# Patient Record
Sex: FEMALE | Race: WHITE | Employment: OTHER
[De-identification: names, ages, dates, MRNs, and addresses within clinical notes are randomized per-mention and may not be internally consistent; named-entity substitution may affect disease eponyms.]

---

## 2023-04-24 ENCOUNTER — HOSPITAL ENCOUNTER (OUTPATIENT)
Facility: HOSPITAL | Age: 83
Setting detail: RECURRING SERIES
Discharge: HOME | End: 2023-04-27
Payer: MEDICARE

## 2023-04-24 PROCEDURE — 97162 PT EVAL MOD COMPLEX 30 MIN: CPT

## 2023-04-24 PROCEDURE — 97110 THERAPEUTIC EXERCISES: CPT

## 2023-04-24 PROCEDURE — 97535 SELF CARE MNGMENT TRAINING: CPT

## 2023-04-24 NOTE — PROGRESS NOTES
PHYSICAL / OCCUPATIONAL THERAPY - DAILY TREATMENT NOTE (updated )  For Eval visit    Patient Name: Margareth Larios    Date: 2023    : 1940  Insurance: Payor: MEDICARE / Plan: MEDICARE PART A AND B / Product Type: *No Product type* /      Patient  verified yes     Visit #   Current / Total 1 16   Time   In / Out 10:22 10:59   Pain   In / Out 0 2   Subjective Functional Status/Changes: See POC   Changes to:  Meds, Allergies, Med Hx, Sx Hx? If yes, update Summary List yes       TREATMENT AREA =  L Shoulder pain    OBJECTIVE       14 min   Eval - untimed                      Therapeutic Procedures: Tx Min Billable or 1:1 Min (if diff from Tx Min) Procedure, Rationale, Specifics   10  29056 Therapeutic Exercise (timed):  increase ROM, strength, coordination, balance, and proprioception to improve patient's ability to progress to PLOF and address remaining functional goals. (see flow sheet as applicable)     Details if applicable:     13  01789 Self Care/Home Management (timed):  improve patient knowledge and understanding of pain reducing techniques, positioning, posture/ergonomics, home safety, activity modification, diagnosis/prognosis, and physical therapy expectations, procedures and progression  to improve patient's ability to progress to PLOF and address remaining functional goals.   (see flow sheet as applicable)     Details if applicable:            Details if applicable:            Details if applicable:     21  [facility] Totals Reminder: bill using total billable min of TIMED therapeutic procedures (example: do not include dry needle or estim unattended, both untimed codes, in totals to left)  8-22 min = 1 unit; 23-37 min = 2 units; 38-52 min = 3 units; 53-67 min = 4 units; 68-82 min = 5 units   Total Total     [x]  Patient Education billed concurrently with other procedures   [x] Review HEP    [] Progressed/Changed HEP, detail:    [] Other detail:       Objective Information/Functional

## 2023-04-24 NOTE — THERAPY EVALUATION
[facility] PHYSICAL THERAPY  [address] [place] [address], [address] [place] [address], [address] [place] [address] [place]:[phone] Fx: [phone]  Plan of Care / Statement of Necessity for Physical Therapy Services     Patient Name: Tee Mcghee : 1940   Medical   Diagnosis: L knee pain Treatment Diagnosis: M25.562  LEFT KNEE PAIN        Onset Date: chronic     Referral Source: Marilyn Reeder MD Start of Care [facility]): 2023   Prior Hospitalization: See medical history Provider #: [id number]   Prior Level of Function: No pain with 10min walking, stair negotiation   Comorbidities: Hearing impairment, visual impairment, GI disease, Incontinence    Medications: Verified on Patient Summary List     Assessment / key information: Tee Mcghee is a 81 y.o. female with Dx: L knee pain starting many years ago. Notes the knee will buckle a few times a month but not due to pain; has more pain with prolonged standing and walking. Pt says she hasn't fallen due to the buckling, but will throw her off balance. Notices some weakness in her leg when she has been standing of 30min. Reports if she walks to the end of her community (10min) she will have a lot of pain in the knee, but the compression brace really helps. Also occasionally has pain at night after a busy day. Reports she has some tingling and numbness along the lateral side of her leg when standing for over 5min. X-rays of the knee last month show the replacement is in great shape for 15 years later. Notes no problems on the R knee but does have occasional numbness. Pt thinks her balance is pretty good but does recall one fall she thinks within the past year due to her trying to move too fast. Pt rates pain as 10/10 max, 0/10 at best, 0/10 currently. Objective: FOTO score = 58 (an established functional score where 100 = no disability). Gait: forwards trunk with decreased hip extension bilat. Palpation TTP along lateral knee and IT band.    Knee ROM Flexion R

## 2023-04-27 ENCOUNTER — APPOINTMENT (OUTPATIENT)
Facility: HOSPITAL | Age: 83
End: 2023-04-27
Payer: MEDICARE

## 2023-05-03 ENCOUNTER — APPOINTMENT (OUTPATIENT)
Facility: HOSPITAL | Age: 83
End: 2023-05-03
Payer: MEDICARE

## 2023-05-05 ENCOUNTER — APPOINTMENT (OUTPATIENT)
Facility: HOSPITAL | Age: 83
End: 2023-05-05
Payer: MEDICARE

## 2023-05-10 ENCOUNTER — HOSPITAL ENCOUNTER (OUTPATIENT)
Facility: HOSPITAL | Age: 83
Setting detail: RECURRING SERIES
Discharge: HOME | End: 2023-05-13
Payer: MEDICARE

## 2023-05-10 PROCEDURE — 97530 THERAPEUTIC ACTIVITIES: CPT

## 2023-05-10 PROCEDURE — 97110 THERAPEUTIC EXERCISES: CPT

## 2023-05-10 NOTE — PROGRESS NOTES
PHYSICAL / OCCUPATIONAL THERAPY - DAILY TREATMENT NOTE (updated )    Patient Name: Lili Bhakta    Date: 5/10/2023    : 1940  Insurance: Payor: MEDICARE / Plan: MEDICARE PART A AND B / Product Type: *No Product type* /      Patient  verified yes     Visit #   Current / Total 2 16   Time   In / Out 9:02 10:40   Pain   In / Out 0 2   Subjective Functional Status/Changes: Reports one instance of knee buckling since IE. Says she didn't fall, but the knee just gave out when she was standing. Changes to:  Meds, Allergies, Med Hx, Sx Hx? If yes, update Summary List yes       TREATMENT AREA =  L Knee pain    OBJECTIVE     Therapeutic Procedures: Tx Min Billable or 1:1 Min (if diff from Tx Min) Procedure, Rationale, Specifics   25  10141 Therapeutic Exercise (timed):  increase ROM, strength, coordination, balance, and proprioception to improve patient's ability to progress to PLOF and address remaining functional goals. (see flow sheet as applicable)     Details if applicable:       77234 Self Care/Home Management (timed):  improve patient knowledge and understanding of pain reducing techniques, positioning, posture/ergonomics, home safety, activity modification, diagnosis/prognosis, and physical therapy expectations, procedures and progression  to improve patient's ability to progress to PLOF and address remaining functional goals. (see flow sheet as applicable)     Details if applicable:     13   36269 Therapeutic Activity (timed):  use of dynamic activities replicating functional movements to increase ROM, strength, coordination, balance, and proprioception in order to improve patient's ability to progress to PLOF and address remaining functional goals.   (see flow sheet as applicable)     Details if applicable:            Details if applicable:     45  [facility] Totals Reminder: bill using total billable min of TIMED therapeutic procedures (example: do not include dry needle or estim unattended, both

## 2023-05-15 ENCOUNTER — HOSPITAL ENCOUNTER (OUTPATIENT)
Facility: HOSPITAL | Age: 83
Setting detail: RECURRING SERIES
Discharge: HOME | End: 2023-05-18
Payer: MEDICARE

## 2023-05-15 PROCEDURE — 97112 NEUROMUSCULAR REEDUCATION: CPT

## 2023-05-15 PROCEDURE — 97110 THERAPEUTIC EXERCISES: CPT

## 2023-05-15 PROCEDURE — 97530 THERAPEUTIC ACTIVITIES: CPT

## 2023-05-15 NOTE — PROGRESS NOTES
PHYSICAL / OCCUPATIONAL THERAPY - DAILY TREATMENT NOTE (updated )    Patient Name: Joanna Beach    Date: 5/15/2023    : 1940  Insurance: Payor: MEDICARE / Plan: MEDICARE PART A AND B / Product Type: *No Product type* /      Patient  verified yes     Visit #   Current / Total 3 16   Time   In / Out 901  AM   Pain   In / Out 0/10 0/10   Subjective Functional Status/Changes: Patient reports having to do a lot yesterday and around 3 PM her LEFT knee started hurting so she put her knee brace on. Felt better after putting it on. . noted this pain was 6/10. Changes to:  Meds, Allergies, Med Hx, Sx Hx? If yes, update Summary List no       TREATMENT AREA =  L Knee pain      OBJECTIVE         Therapeutic Procedures: Tx Min Billable or 1:1 Min (if diff from Tx Min) Procedure, Rationale, Specifics   Total  53 Total  41 [facility] Totals Reminder: bill using total billable min of TIMED therapeutic procedures (example: do not include dry needle or estim unattended, both untimed codes, in totals to left)  8-22 min = 1 unit; 23-37 min = 2 units; 38-52 min = 3 units; 53-67 min = 4 units; 68-82 min = 5 units     62923 Manual Therapy (timed):  decrease pain, increase ROM, increase tissue extensibility, decrease trigger points, and increase postural awareness to improve patient's ability to progress to PLOF and address remaining functional goals. The manual therapy interventions were performed at a separate and distinct time from the therapeutic activities interventions . (see flow sheet as applicable)    Details if applicable  Position:   Technique:      33 21 81393 Therapeutic Exercise (timed):  increase ROM, strength, coordination, balance, and proprioception to improve patient's ability to progress to PLOF and address remaining functional goals.  (see flow sheet as applicable)     Details if applicable:       10 10 60189 Neuromuscular Re-Education (timed):  improve balance, coordination, kinesthetic sense,

## 2023-05-17 ENCOUNTER — HOSPITAL ENCOUNTER (OUTPATIENT)
Facility: HOSPITAL | Age: 83
Setting detail: RECURRING SERIES
Discharge: HOME | End: 2023-05-20
Payer: MEDICARE

## 2023-05-17 PROCEDURE — 97112 NEUROMUSCULAR REEDUCATION: CPT

## 2023-05-17 PROCEDURE — 97110 THERAPEUTIC EXERCISES: CPT

## 2023-05-17 PROCEDURE — 97530 THERAPEUTIC ACTIVITIES: CPT

## 2023-05-17 NOTE — PROGRESS NOTES
challenged by supine SLR and eccentric control with step downs. Great effort with leg press today. Will progress as tolerated. Patient will continue to benefit from skilled PT / OT services to modify and progress therapeutic interventions, analyze and address functional mobility deficits, analyze and address ROM deficits, analyze and address strength deficits, analyze and address soft tissue restrictions, analyze and cue for proper movement patterns, and analyze and modify for postural abnormalities to address functional deficits and attain remaining goals. Progress toward goals / Updated goals:  []  See Progress Note/Recertification    INITIAL EVALUATION 04/24/23  Short Term Goals: To be accomplished in 4 weeks  Independent with HEP to progress to meet goals. Progressing 05/15 reports HEP compliance   2. Pt to report decreased max pain levels less than 7/10 for improvement in ADLs. Current status 05/17 max pain 4/10      Long Term Goals: To be accomplished in 6 weeks  Improve FOTO score to 65/100 to show a significant functional change. 2. Pt to report decreased max pain levels less than 4/10 for improvement in QoL. 3. Pt to report walking 10min without increase in pain to resume previous walking routine.     PLAN  Yes  Continue plan of care  [x]  Upgrade activities as tolerated  []  Discharge due to :  []  Other:     Qamar Nguyễn, PT    5/17/2023    7:33 AM    Future Appointments   Date Time Provider Department Center   5/17/2023  8:20 AM Qamar Nguyễn PT [facility]   5/22/2023  8:20 AM Juan David Cisneros PTA [facility]   5/24/2023  8:20 AM Juan David Cisneros PTA [facility]   5/31/2023  9:00 AM Qamar Nguyễn, PT [facility]   6/5/2023  8:20 AM Qamar Nguyễn PT [facility]   6/7/2023  9:00 AM Qamar Nguyễn PT [facility]   6/12/2023  9:00 AM Qamar Nguyễn, PT [facility]   6/14/2023  9:00 AM Qamar Nguyễn, PT [facility]   6/19/2023  9:00 AM Qamar Nguyễn, PT [facility]   6/21/2023  9:00 AM Qamar Nguyễn, PT [facility] [place] [facility]   6/26/2023  9:00 AM

## 2023-05-22 ENCOUNTER — HOSPITAL ENCOUNTER (OUTPATIENT)
Facility: HOSPITAL | Age: 83
Setting detail: RECURRING SERIES
Discharge: HOME | End: 2023-05-25
Payer: MEDICARE

## 2023-05-22 PROCEDURE — 97112 NEUROMUSCULAR REEDUCATION: CPT

## 2023-05-22 PROCEDURE — 97110 THERAPEUTIC EXERCISES: CPT

## 2023-05-22 PROCEDURE — 97530 THERAPEUTIC ACTIVITIES: CPT

## 2023-05-22 NOTE — PROGRESS NOTES
PHYSICAL / OCCUPATIONAL THERAPY - DAILY TREATMENT NOTE (updated )    Patient Name: Mj Alvarado    Date: 2023    : 1940  Insurance: Payor: MEDICARE / Plan: MEDICARE PART A AND B / Product Type: *No Product type* /      Patient  verified yes     Visit #   Current / Total 5 16   Time   In / Out 815  AM   Pain   In / Out 0/10 0/10   Subjective Functional Status/Changes: Patient reports no pain this morning. States her RIGHT knee was bothering her a little bit because she walked up the clinic's stairs. Changes to:  Meds, Allergies, Med Hx, Sx Hx? If yes, update Summary List no       TREATMENT AREA =  L Knee pain      OBJECTIVE    Therapeutic Procedures: Tx Min Billable or 1:1 Min (if diff from Tx Min) Procedure, Rationale, Specifics   Total  45 Total   [facility] Totals Reminder: bill using total billable min of TIMED therapeutic procedures (example: do not include dry needle or estim unattended, both untimed codes, in totals to left)  8-22 min = 1 unit; 23-37 min = 2 units; 38-52 min = 3 units; 53-67 min = 4 units; 68-82 min = 5 units     40341 Manual Therapy (timed):  decrease pain, increase ROM, increase tissue extensibility, decrease trigger points, and increase postural awareness to improve patient's ability to progress to PLOF and address remaining functional goals. The manual therapy interventions were performed at a separate and distinct time from the therapeutic activities interventions . (see flow sheet as applicable)    Details if applicable  Position:   Technique:      20  01245 Therapeutic Exercise (timed):  increase ROM, strength, coordination, balance, and proprioception to improve patient's ability to progress to PLOF and address remaining functional goals.  (see flow sheet as applicable)     Details if applicable:       10  58821 Neuromuscular Re-Education (timed):  improve balance, coordination, kinesthetic sense, posture, core stability and proprioception to improve patient's

## 2023-05-23 NOTE — THERAPY RECERTIFICATION
[address] [facility], [address] - Phone: [phone]  Fax: [phone]  CONTINUED PLAN OF CARE/RECERTIFICATION FOR PHYSICAL THERAPY          Patient Name: Adi Collins : 1940   Treatment/Medical Diagnosis: Pain in left knee [M25.562]   Onset Date: chronic    Referral Source: Bi Guaman MD Start of Care [facility]): 23   Prior Hospitalization: See Medical History Provider #: [id number]   Prior Level of Function: No pain with 10min walking, stair negotiation   Comorbidities: Hearing impairment, visual impairment, GI disease, Incontinence    Medications: Verified on Patient Summary List   Vis[facility] from [facility]: 6 Missed Visits: 0     Progress to Goals:  Improve FOTO score to 65/100 to show a significant functional change. Status at last Eval: 53/100  Current Status: 62/100  Goal Met?  progressing    2. Pt to report decreased max pain levels less than 4/10 for improvement in QoL. Status at last Eval: 10/10  Current Status: 7/10  Goal Met?  progressing    3. Pt to report walking 10min without increase in pain to resume previous walking routine. Status at last Eval: unable, very painful  Current Status: \"most of the time, hurts sometimes\"   Goal Met?  progressing    Key Functional Changes/Progress: Ms. Katharina Kurtz has made steady progress with PT interventions for LEFT knee pain. In the last 2 weeks, pain has ranged between 0-7/10 and located around the patella. Noting more aching and soreness vs sharp pain or red flags. Notes elevation in symptoms with prolonged walking (>20 minutes). However patient reports improvement with her standing and walking tolerance compared to initially arriving to PT. Patient has also demonstrated good compliance with  light strengthening exercises. Current FOTO 62/100 (increase 9 points) and +4 on GROC indicating functional improvement.  Patient would continue to benefit from skilled PT to continue to

## 2023-05-24 ENCOUNTER — HOSPITAL ENCOUNTER (OUTPATIENT)
Facility: HOSPITAL | Age: 83
Setting detail: RECURRING SERIES
Discharge: HOME | End: 2023-05-27
Payer: MEDICARE

## 2023-05-24 PROCEDURE — 97112 NEUROMUSCULAR REEDUCATION: CPT

## 2023-05-24 PROCEDURE — 97110 THERAPEUTIC EXERCISES: CPT

## 2023-05-24 PROCEDURE — 97530 THERAPEUTIC ACTIVITIES: CPT

## 2023-05-24 NOTE — PROGRESS NOTES
PHYSICAL / OCCUPATIONAL THERAPY - DAILY TREATMENT NOTE (updated )    Patient Name: Mj Alvarado    Date: 2023    : 1940  Insurance: Payor: MEDICARE / Plan: MEDICARE PART A AND B / Product Type: *No Product type* /      Patient  verified yes     Visit #   Current / Total 6 16   Time   In / Out 818  AM   Pain   In / Out 2/10 010   Subjective Functional Status/Changes: Patient reports having some pain this morning. Pointing the lateral joint line  thinks it could have been from doing a lot the last 2 days (ie: running errands). Denies using modalities or medication to assist with the symptoms. SEE PN   Changes to:  Meds, Allergies, Med Hx, Sx Hx? If yes, update Summary List no       TREATMENT AREA =  L Knee pain      OBJECTIVE    Therapeutic Procedures: Tx Min Billable or 1:1 Min (if diff from Tx Min) Procedure, Rationale, Specifics   Total  42 Total   [facility] Totals Reminder: bill using total billable min of TIMED therapeutic procedures (example: do not include dry needle or estim unattended, both untimed codes, in totals to left)  8-22 min = 1 unit; 23-37 min = 2 units; 38-52 min = 3 units; 53-67 min = 4 units; 68-82 min = 5 units     11354 Manual Therapy (timed):  decrease pain, increase ROM, increase tissue extensibility, decrease trigger points, and increase postural awareness to improve patient's ability to progress to PLOF and address remaining functional goals. The manual therapy interventions were performed at a separate and distinct time from the therapeutic activities interventions . (see flow sheet as applicable)    Details if applicable  Position:   Technique:      22  68083 Therapeutic Exercise (timed):  increase ROM, strength, coordination, balance, and proprioception to improve patient's ability to progress to PLOF and address remaining functional goals.  (see flow sheet as applicable)     Details if applicable:       10  17742 Neuromuscular Re-Education (timed):  improve

## 2023-05-31 ENCOUNTER — HOSPITAL ENCOUNTER (OUTPATIENT)
Facility: HOSPITAL | Age: 83
Setting detail: RECURRING SERIES
Discharge: HOME | End: 2023-06-03
Payer: MEDICARE

## 2023-05-31 PROCEDURE — 97110 THERAPEUTIC EXERCISES: CPT

## 2023-05-31 PROCEDURE — 97530 THERAPEUTIC ACTIVITIES: CPT

## 2023-05-31 NOTE — PROGRESS NOTES
PHYSICAL / OCCUPATIONAL THERAPY - DAILY TREATMENT NOTE (updated )    Patient Name: Sylvia Reed    Date: 2023    : 1940  Insurance: Payor: MEDICARE / Plan: MEDICARE PART A AND B / Product Type: *No Product type* /      Patient  verified yes     Visit #   Current / Total 7 16   Time   In / Out 9:00  AM   Pain   In / Out 0/10 0/10   Subjective Functional Status/Changes: Notes she thinks the knees feel about the same. Changes to:  Meds, Allergies, Med Hx, Sx Hx? If yes, update Summary List no       TREATMENT AREA =  L Knee pain      OBJECTIVE    Therapeutic Procedures: Tx Min Billable or 1:1 Min (if diff from Tx Min) Procedure, Rationale, Specifics   Total  40 Total   [facility] Totals Reminder: bill using total billable min of TIMED therapeutic procedures (example: do not include dry needle or estim unattended, both untimed codes, in totals to left)  8-22 min = 1 unit; 23-37 min = 2 units; 38-52 min = 3 units; 53-67 min = 4 units; 68-82 min = 5 units     05289 Manual Therapy (timed):  decrease pain, increase ROM, increase tissue extensibility, decrease trigger points, and increase postural awareness to improve patient's ability to progress to PLOF and address remaining functional goals. The manual therapy interventions were performed at a separate and distinct time from the therapeutic activities interventions . (see flow sheet as applicable)    Details if applicable  Position:   Technique:      15  35542 Therapeutic Exercise (timed):  increase ROM, strength, coordination, balance, and proprioception to improve patient's ability to progress to PLOF and address remaining functional goals.  (see flow sheet as applicable)     Details if applicable:         81041 Neuromuscular Re-Education (timed):  improve balance, coordination, kinesthetic sense, posture, core stability and proprioception to improve patient's ability to develop conscious control of individual muscles and awareness of position of

## 2023-06-05 ENCOUNTER — HOSPITAL ENCOUNTER (OUTPATIENT)
Facility: HOSPITAL | Age: 83
Setting detail: RECURRING SERIES
Discharge: HOME | End: 2023-06-08
Payer: MEDICARE

## 2023-06-05 PROCEDURE — 97530 THERAPEUTIC ACTIVITIES: CPT

## 2023-06-05 PROCEDURE — 97110 THERAPEUTIC EXERCISES: CPT

## 2023-06-05 PROCEDURE — 97140 MANUAL THERAPY 1/> REGIONS: CPT

## 2023-06-05 NOTE — PROGRESS NOTES
kinesthetic sense, posture, core stability and proprioception to improve patient's ability to develop conscious control of individual muscles and awareness of position of extremities in order to progress to PLOF and address remaining functional goals. (see flow sheet as applicable)     Details if applicable:     19    51491 Therapeutic Activity (timed):  use of dynamic activities replicating functional movements to increase ROM, strength, coordination, balance, and proprioception in order to improve patient's ability to progress to PLOF and address remaining functional goals. (see flow sheet as applicable)     Details if applicable:       55626 Gait Training (timed):     feet with _ device on level surfaces with _ level of assistance to improve ambulation safety and efficiency in order to improve patient's ability to safely ambulate at home for self care. Details if applicable:         65006 Self Care/Home Management (timed):  improve patient knowledge and understanding of pain reducing techniques, positioning, posture/ergonomics, home safety, activity modification, diagnosis/prognosis, and physical therapy expectations, procedures and progression  to improve patient's ability to progress to PLOF and address remaining functional goals. (see flow sheet as applicable)     Details if applicable:    []HEP review (emphasis on compliance)  []Issued and reviewed long-term HEP  Other:          [x]  Patient Education billed concurrently with other procedures   [x] Review HEP    [] Progressed/Changed HEP, detail:   [] Other detail:     [employer] Access Code Date Issued           Objective Information/Functional Measures/Assessment     Better control noted with step downs today. Also added sit to stands with weight and incline HR. Not problems with therex and pt reported numbness decreasing half way through session.     Patient will continue to benefit from skilled PT / OT services to modify and progress therapeutic

## 2023-06-07 ENCOUNTER — HOSPITAL ENCOUNTER (OUTPATIENT)
Facility: HOSPITAL | Age: 83
Setting detail: RECURRING SERIES
Discharge: HOME | End: 2023-06-10
Payer: MEDICARE

## 2023-06-07 PROCEDURE — 97530 THERAPEUTIC ACTIVITIES: CPT

## 2023-06-07 PROCEDURE — 97110 THERAPEUTIC EXERCISES: CPT

## 2023-06-07 NOTE — PROGRESS NOTES
PHYSICAL / OCCUPATIONAL THERAPY - DAILY TREATMENT NOTE (updated )    Patient Name: Alonso Bashir    Date: 2023    : 1940  Insurance: Payor: MEDICARE / Plan: MEDICARE PART A AND B / Product Type: *No Product type* /      Patient  verified yes     Visit #   Current / Total 9 16   Time   In / Out 8:58 AM 9:38 AM   Pain   In / Out 0/10 0/10   Subjective Functional Status/Changes: Knee feels good today. No pain right now. Changes to:  Meds, Allergies, Med Hx, Sx Hx? If yes, update Summary List no       TREATMENT AREA =  L Knee pain    OBJECTIVE    Therapeutic Procedures: Tx Min Billable or 1:1 Min (if diff from Tx Min) Procedure, Rationale, Specifics   Total  40 Total  38 [facility] Totals Reminder: bill using total billable min of TIMED therapeutic procedures (example: do not include dry needle or estim unattended, both untimed codes, in totals to left)  8-22 min = 1 unit; 23-37 min = 2 units; 38-52 min = 3 units; 53-67 min = 4 units; 68-82 min = 5 units     72013 Manual Therapy (timed):  decrease pain, increase ROM, increase tissue extensibility, decrease trigger points, and increase postural awareness to improve patient's ability to progress to PLOF and address remaining functional goals. The manual therapy interventions were performed at a separate and distinct time from the therapeutic activities interventions . (see flow sheet as applicable)    Details if applicable  Position: supine  Technique: patellar mobs, fibular head mobs, joint oscillations, STM to lateral rectus, IT band, and gastroc     23 21 03664 Therapeutic Exercise (timed):  increase ROM, strength, coordination, balance, and proprioception to improve patient's ability to progress to PLOF and address remaining functional goals.  (see flow sheet as applicable)     Details if applicable:         61411 Neuromuscular Re-Education (timed):  improve balance, coordination, kinesthetic sense, posture, core stability and proprioception to

## 2023-06-19 ENCOUNTER — HOSPITAL ENCOUNTER (OUTPATIENT)
Facility: HOSPITAL | Age: 83
Setting detail: RECURRING SERIES
Discharge: HOME | End: 2023-06-22
Payer: MEDICARE

## 2023-06-19 PROCEDURE — 97530 THERAPEUTIC ACTIVITIES: CPT

## 2023-06-19 PROCEDURE — 97110 THERAPEUTIC EXERCISES: CPT

## 2023-06-19 PROCEDURE — 97112 NEUROMUSCULAR REEDUCATION: CPT

## 2023-06-19 NOTE — PROGRESS NOTES
PHYSICAL / OCCUPATIONAL THERAPY - DAILY TREATMENT NOTE (updated )    Patient Name: Lili Bhakta    Date: 2023    : 1940  Insurance: Payor: MEDICARE / Plan: MEDICARE PART A AND B / Product Type: *No Product type* /      Patient  verified yes     Visit #   Current / Total 12 16   Time   In / Out 9:00 AM 9:40 AM   Pain   In / Out 0/10 0/10   Subjective Functional Status/Changes: Reports no pain in the knee. Changes to:  Meds, Allergies, Med Hx, Sx Hx? If yes, update Summary List no       TREATMENT AREA =  L Knee pain    OBJECTIVE    Therapeutic Procedures: Tx Min Billable or 1:1 Min (if diff from Tx Min) Procedure, Rationale, Specifics   Total  40 Total   MC BC Totals Reminder: bill using total billable min of TIMED therapeutic procedures (example: do not include dry needle or estim unattended, both untimed codes, in totals to left)  8-22 min = 1 unit; 23-37 min = 2 units; 38-52 min = 3 units; 53-67 min = 4 units; 68-82 min = 5 units     31784 Manual Therapy (timed):  decrease pain, increase ROM, increase tissue extensibility, decrease trigger points, and increase postural awareness to improve patient's ability to progress to PLOF and address remaining functional goals. The manual therapy interventions were performed at a separate and distinct time from the therapeutic activities interventions . (see flow sheet as applicable)    Details if applicable  Position: supine  Technique: patellar mobs, fibular head mobs, joint oscillations, STM to lateral rectus, IT band, and gastroc     12  [id number] Therapeutic Exercise (timed):  increase ROM, strength, coordination, balance, and proprioception to improve patient's ability to progress to PLOF and address remaining functional goals.  (see flow sheet as applicable)     Details if applicable:     8  [id number] Neuromuscular Re-Education (timed):  improve balance, coordination, kinesthetic sense, posture, core stability and proprioception to improve patient's

## 2023-06-21 ENCOUNTER — HOSPITAL ENCOUNTER (OUTPATIENT)
Facility: HOSPITAL | Age: 83
Setting detail: RECURRING SERIES
Discharge: HOME | End: 2023-06-24
Payer: MEDICARE

## 2023-06-21 PROCEDURE — 97530 THERAPEUTIC ACTIVITIES: CPT

## 2023-06-21 PROCEDURE — 97112 NEUROMUSCULAR REEDUCATION: CPT

## 2023-06-21 PROCEDURE — 97110 THERAPEUTIC EXERCISES: CPT

## 2023-06-21 NOTE — PROGRESS NOTES
PHYSICAL / OCCUPATIONAL THERAPY - DAILY TREATMENT NOTE (updated )    Patient Name: Colleen Ashton    Date: 2023    : 1940  Insurance: Payor: MEDICARE / Plan: MEDICARE PART A AND B / Product Type: *No Product type* /      Patient  verified yes     Visit #   Current / Total 13 16   Time   In / Out 9:00 AM 9:46 AM   Pain   In / Out 0/10 0/10   Subjective Functional Status/Changes: Says she is feeling good. No pain in the knee. No giving out in a long time. Changes to:  Meds, Allergies, Med Hx, Sx Hx? If yes, update Summary List no       TREATMENT AREA =  L Knee pain    OBJECTIVE    Therapeutic Procedures: Tx Min Billable or 1:1 Min (if diff from Tx Min) Procedure, Rationale, Specifics   Total  46 Total   MC BC Totals Reminder: bill using total billable min of TIMED therapeutic procedures (example: do not include dry needle or estim unattended, both untimed codes, in totals to left)  8-22 min = 1 unit; 23-37 min = 2 units; 38-52 min = 3 units; 53-67 min = 4 units; 68-82 min = 5 units   18  [id number] Therapeutic Exercise (timed):  increase ROM, strength, coordination, balance, and proprioception to improve patient's ability to progress to PLOF and address remaining functional goals. (see flow sheet as applicable)     Details if applicable:      Neuromuscular Re-Education (timed):  improve balance, coordination, kinesthetic sense, posture, core stability and proprioception to improve patient's ability to develop conscious control of individual muscles and awareness of position of extremities in order to progress to PLOF and address remaining functional goals. (see flow sheet as applicable)     Details if applicable:     486 Therapeutic Activity (timed):  use of dynamic activities replicating functional movements to increase ROM, strength, coordination, balance, and proprioception in order to improve patient's ability to progress to PLOF and address remaining functional goals.   (see flow

## 2023-06-26 ENCOUNTER — HOSPITAL ENCOUNTER (OUTPATIENT)
Facility: HOSPITAL | Age: 83
Setting detail: RECURRING SERIES
Discharge: HOME | End: 2023-06-29
Payer: MEDICARE

## 2023-06-26 PROCEDURE — 97535 SELF CARE MNGMENT TRAINING: CPT

## 2023-06-26 PROCEDURE — 97530 THERAPEUTIC ACTIVITIES: CPT

## 2023-06-26 PROCEDURE — 97110 THERAPEUTIC EXERCISES: CPT
